# Patient Record
Sex: FEMALE | Race: WHITE | NOT HISPANIC OR LATINO | URBAN - METROPOLITAN AREA
[De-identification: names, ages, dates, MRNs, and addresses within clinical notes are randomized per-mention and may not be internally consistent; named-entity substitution may affect disease eponyms.]

---

## 2023-03-04 PROBLEM — Z17.0 MALIGNANT NEOPLASM OF UPPER-OUTER QUADRANT OF BREAST IN FEMALE, ESTROGEN RECEPTOR POSITIVE: Status: ACTIVE | Noted: 2023-03-04

## 2023-03-04 PROBLEM — Z80.41 FAMILY HISTORY OF MALIGNANT NEOPLASM OF OVARY: Status: ACTIVE | Noted: 2023-03-04

## 2023-03-04 PROBLEM — C50.419 MALIGNANT NEOPLASM OF UPPER-OUTER QUADRANT OF BREAST IN FEMALE, ESTROGEN RECEPTOR POSITIVE: Status: ACTIVE | Noted: 2023-03-04

## 2023-03-04 PROBLEM — N64.4 MASTODYNIA: Status: ACTIVE | Noted: 2023-03-04

## 2023-03-04 PROBLEM — Z80.3 FAMILY HISTORY OF MALIGNANT NEOPLASM OF BREAST: Status: ACTIVE | Noted: 2023-03-04

## 2023-04-07 DIAGNOSIS — Z17.0 MALIGNANT NEOPLASM OF UPPER-OUTER QUADRANT OF RIGHT BREAST IN FEMALE, ESTROGEN RECEPTOR POSITIVE: Primary | ICD-10-CM

## 2023-04-07 DIAGNOSIS — C50.411 MALIGNANT NEOPLASM OF UPPER-OUTER QUADRANT OF RIGHT BREAST IN FEMALE, ESTROGEN RECEPTOR POSITIVE: Primary | ICD-10-CM

## 2023-04-29 DIAGNOSIS — C50.411 MALIGNANT NEOPLASM OF UPPER-OUTER QUADRANT OF RIGHT BREAST IN FEMALE, ESTROGEN RECEPTOR POSITIVE: Primary | ICD-10-CM

## 2023-04-29 DIAGNOSIS — Z17.0 MALIGNANT NEOPLASM OF UPPER-OUTER QUADRANT OF RIGHT BREAST IN FEMALE, ESTROGEN RECEPTOR POSITIVE: Primary | ICD-10-CM

## 2023-04-29 NOTE — PROGRESS NOTES
[facility]  Shemar Coleman MD, FACS  Ev Cornejo NP-C      Chief Complaint:   Josey Gill is a 48 y.o. female presenting today for her 6 month follow up due to her breast cancer diagnosis.  She is due for her Mammogram and CXR.  She reports no interval changes on her self-breast examination.     History of Present Illness:   Mrs. Gill was diagnosed with right breast cancer in May 2016 at the age of 41. She elected lumpectomy that showed a 3 cm Grade II IDC with negative marigns.  2 of 4 SLNs were positive but there was no ARIELA. She completed adjuvant chemotherapy. She also completed adjuvant radiation with Dr. Graciela Whittington and has been on Tamoxifen since May 2017.   ER/NC Positive and HER 2 normal by FISH.  [employer] Comprehensive Panel Negative (2016). MD::: Pita Webb MD; Dean Paris MD; Ronni Driver MD    Past Medical History:   Diagnosis Date    Family history of malignant neoplasm of breast 3/4/2023    Family history of malignant neoplasm of ovary 3/4/2023    Malignant neoplasm of upper-outer quadrant of breast in female, estrogen receptor positive 3/4/2023    Mastodynia 3/4/2023      Past Surgical History:   Procedure Laterality Date    LUMPECTOMY, BREAST Right     with SLN biopsy    PORTACATH PLACEMENT          Current Outpatient Medications:     albuterol (PROVENTIL/VENTOLIN HFA) 90 mcg/actuation inhaler, Inhale into the lungs., Disp: , Rfl:     CALCIUM ORAL, Take by mouth., Disp: , Rfl:     celecoxib (CELEBREX) 200 MG capsule, Take 200 mg by mouth., Disp: , Rfl:     dulaglutide (TRULICITY) 0.75 mg/0.5 mL pen injector, Inject 0.8 mg into the skin., Disp: , Rfl:     ergocalciferol, vitamin D2, (VITAMIN D ORAL), Take by mouth., Disp: , Rfl:     fluticasone propionate (FLONASE) 50 mcg/actuation nasal spray, by Each Nostril route., Disp: , Rfl:     ipratropium (ATROVENT) 42 mcg (0.06 %) nasal spray, 2 sprays by Nasal route., Disp: , Rfl:     levothyroxine  (SYNTHROID) 25 MCG tablet, Take 25 mcg by mouth., Disp: , Rfl:     MOUNJARO 12.5 mg/0.5 mL PnIj, Inject into the skin., Disp: , Rfl:     ondansetron (ZOFRAN) 8 MG tablet, Take 1 tablet by mouth every 8 (eight) hours as needed., Disp: , Rfl:     pantoprazole (PROTONIX) 40 MG tablet, Take 1 tablet by mouth every morning., Disp: , Rfl:     propranoloL (INDERAL LA) 120 MG 24 hr capsule, Take 120 mg by mouth., Disp: , Rfl:     rosuvastatin (CRESTOR) 10 MG tablet, Take 10 mg by mouth every evening., Disp: , Rfl:     tamoxifen (NOLVADEX) 20 MG Tab, Take 20 mg by mouth., Disp: , Rfl:     vitamin E acetate (VITAMIN E ORAL), Take by mouth., Disp: , Rfl:    Review of patient's allergies indicates:   Allergen Reactions    Morphine Hives     Rash to face      Social History     Tobacco Use    Smoking status: Every Day     Types: Cigarettes    Smokeless tobacco: Never   Substance Use Topics    Alcohol use: Not on file      Family History   Problem Relation Age of Onset    Breast cancer Maternal Grandmother 75    Breast cancer Maternal Aunt 50    Breast cancer Maternal Aunt     Breast cancer Maternal Aunt 43    Ovarian cancer Maternal Cousin         Review of Systems   Integumentary:  Negative for color change, rash, mole/lesion, breast mass, breast discharge and breast tenderness.   Breast: Negative for mass and tenderness     Physical Exam   Constitutional: She is cooperative.   HENT:   Head: Normocephalic.   Pulmonary/Chest: She exhibits no mass. Right breast exhibits no inverted nipple, no mass, no nipple discharge, no skin change and no tenderness. Left breast exhibits no inverted nipple, no mass, no nipple discharge, no skin change and no tenderness.   Abdominal: Normal appearance.   Musculoskeletal: Lymphadenopathy:      Upper Body:      Right upper body: No supraclavicular or axillary adenopathy.      Left upper body: No supraclavicular or axillary adenopathy.     Neurological: She is alert.   Skin: No rash noted.         MAMMOGRAM REPORT: She has some diffuse fibronodular tissue bilaterally; there are no spiculated lesions, distortions or suspicious calcifications noted; NEM    CXR REPORT: Her lungs are well aerated and without worrisome masses; no pleural effusion noted; NEM      NOTE:::We viewed her films together at today's visit.  We discussed the multiple views obtained and the important findings.  Even benign changes were mentioned and her questions were answered.  She knows that she may receive a formal letter or report from the Radiologist.  She is to contact us if she has questions.      ASSESSMENT and PLAN of CARE    1. Malignant neoplasm of upper-outer quadrant of right breast in female, estrogen receptor positive  Assessment & Plan:   Patient is doing well and is being followed according to NCCN Guidelines. She understands that her imaging and exams have remained stable and is comfortable being followed in a conservative fashion. She understands the importance of monthly self-breast examination and knows to report any and all changes as they occur.    She should continue her Tamoxifen as directed.  We discussed some of the side effects and methods to combat these.  Should she have any issues, she can contact us as needed.    Labs obtained today: CBC, Chem 12, CEA, LDH, CA27/29 - after resulted, these will be compared to her previous values and all abnormal values will be phoned to her for discussion.      Orders:  -     Lactate Dehydrogenase  -     Comprehensive Metabolic Panel  -     CEA  -     CBC Auto Differential  -     Cancer Antigen 27-29    2. Mastodynia  Assessment & Plan:  We discussed our fibrocystic mastopathy protocol in detail. She knows that if she follows this protocol - that her symptoms should improve.  We discussed how breast pain is usually not associated with breast cancer, however, pain can be the presenting symptom with some cancers (but this could be coincidental). Still, if her pain does not  improve in 8-12 weeks she should call us back for additional recommendations.        3. Family history of malignant neoplasm of breast  Assessment & Plan:  We discussed her family history and how it could impact her own future risks.  We discussed family vs. genetic history and the importance and implications of each.  Genetic Counseling/Testing was offered, and all questions answered to her satisfaction.  She knows that as additional family members are diagnosed - she will need to let us know as this may change follow up and imaging recommendations.        Medical Decision Making:  It is my impression that the patient suffers from all the listed conditions.  Each of these conditions did affect my Plan of Care and all medical decisions that were rendered.  The medical decision making was of high difficulty based on her comorbidities and her previous diagnosis of breast cancer, which can pose a direct threat to her life.  This patient is currently being treated with ongoing Hormonal Agents that pose inherent risks. Laboratory evaluations are currently pending but will be reviewed in the next 24 hours. Any further recommendations will be communicated to the patient by me.  I have reviewed and verified her allergies, list of medications, medical and surgical histories, social history, and a pertinent review of symptoms.      Follow up:  6 months and prn    For:  PE and US with me    Orders Placed This Encounter   Procedures    Lactate Dehydrogenase     Please fax a copy of all labs on this date to Dr. Dean Paris.  Thanks.    Comprehensive Metabolic Panel     Please fax a copy of all labs on this date to Dr. Dean Paris.  Thanks.    CEA     Please fax a copy of all labs on this date to Dr. Dean Paris.  Thanks.    CBC Auto Differential     Please fax a copy of all labs on this date to Dr. Dean Paris.  Thanks.    Cancer Antigen 27-29

## 2023-04-29 NOTE — ASSESSMENT & PLAN NOTE
Patient is doing well and is being followed according to NCCN Guidelines. She understands that her imaging and exams have remained stable and is comfortable being followed in a conservative fashion. She understands the importance of monthly self-breast examination and knows to report any and all changes as they occur.    She should continue her Tamoxifen as directed.  We discussed some of the side effects and methods to combat these.  Should she have any issues, she can contact us as needed.    Labs obtained today: CBC, Chem 12, CEA, LDH, CA27/29 - after resulted, these will be compared to her previous values and all abnormal values will be phoned to her for discussion.

## 2023-05-10 ENCOUNTER — OFFICE VISIT (OUTPATIENT)
Dept: SURGERY | Facility: CLINIC | Age: 48
End: 2023-05-10
Payer: COMMERCIAL

## 2023-05-10 DIAGNOSIS — Z17.0 MALIGNANT NEOPLASM OF UPPER-OUTER QUADRANT OF RIGHT BREAST IN FEMALE, ESTROGEN RECEPTOR POSITIVE: ICD-10-CM

## 2023-05-10 DIAGNOSIS — C50.411 MALIGNANT NEOPLASM OF UPPER-OUTER QUADRANT OF RIGHT BREAST IN FEMALE, ESTROGEN RECEPTOR POSITIVE: ICD-10-CM

## 2023-05-10 DIAGNOSIS — Z80.3 FAMILY HISTORY OF MALIGNANT NEOPLASM OF BREAST: ICD-10-CM

## 2023-05-10 DIAGNOSIS — N64.4 MASTODYNIA: ICD-10-CM

## 2023-05-10 LAB
ALBUMIN SERPL BCP-MCNC: 4 G/DL (ref 3.5–5.2)
ALP SERPL-CCNC: 40 U/L (ref 55–135)
ALT SERPL W/O P-5'-P-CCNC: 7 U/L (ref 10–44)
ANION GAP SERPL CALC-SCNC: 7 MMOL/L (ref 8–16)
AST SERPL-CCNC: 17 U/L (ref 10–40)
BASOPHILS # BLD AUTO: 0.05 K/UL (ref 0–0.2)
BASOPHILS NFR BLD: 1 % (ref 0–1.9)
BILIRUB SERPL-MCNC: 0.4 MG/DL (ref 0.1–1)
BUN SERPL-MCNC: 11 MG/DL (ref 6–20)
CALCIUM SERPL-MCNC: 9.7 MG/DL (ref 8.7–10.5)
CEA SERPL-MCNC: <1.7 NG/ML (ref 0–5)
CHLORIDE SERPL-SCNC: 108 MMOL/L (ref 95–110)
CO2 SERPL-SCNC: 26 MMOL/L (ref 23–29)
CREAT SERPL-MCNC: 0.7 MG/DL (ref 0.5–1.4)
DIFFERENTIAL METHOD: NORMAL
EOSINOPHIL # BLD AUTO: 0.1 K/UL (ref 0–0.5)
EOSINOPHIL NFR BLD: 2.7 % (ref 0–8)
ERYTHROCYTE [DISTWIDTH] IN BLOOD BY AUTOMATED COUNT: 12.1 % (ref 11.5–14.5)
EST. GFR  (NO RACE VARIABLE): >60 ML/MIN/1.73 M^2
GLUCOSE SERPL-MCNC: 70 MG/DL (ref 70–110)
HCT VFR BLD AUTO: 38.5 % (ref 37–48.5)
HGB BLD-MCNC: 12.8 G/DL (ref 12–16)
IMM GRANULOCYTES # BLD AUTO: 0.01 K/UL (ref 0–0.04)
IMM GRANULOCYTES NFR BLD AUTO: 0.2 % (ref 0–0.5)
LDH SERPL L TO P-CCNC: 127 U/L (ref 110–260)
LYMPHOCYTES # BLD AUTO: 1.9 K/UL (ref 1–4.8)
LYMPHOCYTES NFR BLD: 35.8 % (ref 18–48)
MCH RBC QN AUTO: 30.5 PG (ref 27–31)
MCHC RBC AUTO-ENTMCNC: 33.2 G/DL (ref 32–36)
MCV RBC AUTO: 92 FL (ref 82–98)
MONOCYTES # BLD AUTO: 0.3 K/UL (ref 0.3–1)
MONOCYTES NFR BLD: 6.1 % (ref 4–15)
NEUTROPHILS # BLD AUTO: 2.8 K/UL (ref 1.8–7.7)
NEUTROPHILS NFR BLD: 54.2 % (ref 38–73)
NRBC BLD-RTO: 0 /100 WBC
PLATELET # BLD AUTO: 175 K/UL (ref 150–450)
PMV BLD AUTO: 12.1 FL (ref 9.2–12.9)
POTASSIUM SERPL-SCNC: 4.2 MMOL/L (ref 3.5–5.1)
PROT SERPL-MCNC: 6.2 G/DL (ref 6–8.4)
RBC # BLD AUTO: 4.19 M/UL (ref 4–5.4)
SODIUM SERPL-SCNC: 141 MMOL/L (ref 136–145)
WBC # BLD AUTO: 5.22 K/UL (ref 3.9–12.7)

## 2023-05-10 PROCEDURE — 80053 COMPREHEN METABOLIC PANEL: CPT | Performed by: SPECIALIST

## 2023-05-10 PROCEDURE — 1159F PR MEDICATION LIST DOCUMENTED IN MEDICAL RECORD: ICD-10-PCS | Mod: CPTII,S$GLB,, | Performed by: SPECIALIST

## 2023-05-10 PROCEDURE — 1160F RVW MEDS BY RX/DR IN RCRD: CPT | Mod: CPTII,S$GLB,, | Performed by: SPECIALIST

## 2023-05-10 PROCEDURE — 86300 IMMUNOASSAY TUMOR CA 15-3: CPT | Performed by: SPECIALIST

## 2023-05-10 PROCEDURE — 1159F MED LIST DOCD IN RCRD: CPT | Mod: CPTII,S$GLB,, | Performed by: SPECIALIST

## 2023-05-10 PROCEDURE — 1160F PR REVIEW ALL MEDS BY PRESCRIBER/CLIN PHARMACIST DOCUMENTED: ICD-10-PCS | Mod: CPTII,S$GLB,, | Performed by: SPECIALIST

## 2023-05-10 PROCEDURE — 83615 LACTATE (LD) (LDH) ENZYME: CPT | Performed by: SPECIALIST

## 2023-05-10 PROCEDURE — 99214 PR OFFICE/OUTPT VISIT, EST, LEVL IV, 30-39 MIN: ICD-10-PCS | Mod: S$GLB,,, | Performed by: SPECIALIST

## 2023-05-10 PROCEDURE — 85025 COMPLETE CBC W/AUTO DIFF WBC: CPT | Performed by: SPECIALIST

## 2023-05-10 PROCEDURE — 82378 CARCINOEMBRYONIC ANTIGEN: CPT | Performed by: SPECIALIST

## 2023-05-10 PROCEDURE — 99214 OFFICE O/P EST MOD 30 MIN: CPT | Mod: S$GLB,,, | Performed by: SPECIALIST

## 2023-05-10 RX ORDER — FLUTICASONE PROPIONATE 50 MCG
SPRAY, SUSPENSION (ML) NASAL
COMMUNITY
Start: 2023-05-08

## 2023-05-10 RX ORDER — IPRATROPIUM BROMIDE 42 UG/1
2 SPRAY, METERED NASAL
COMMUNITY
Start: 2023-04-12 | End: 2024-04-11

## 2023-05-10 RX ORDER — DULAGLUTIDE 0.75 MG/.5ML
0.8 INJECTION, SOLUTION SUBCUTANEOUS
COMMUNITY
Start: 2023-04-03 | End: 2023-09-30

## 2023-05-10 RX ORDER — LEVOTHYROXINE SODIUM 25 UG/1
25 TABLET ORAL
COMMUNITY
Start: 2023-02-23

## 2023-05-10 RX ORDER — TAMOXIFEN CITRATE 20 MG/1
20 TABLET ORAL
COMMUNITY
Start: 2023-04-13

## 2023-05-10 RX ORDER — ALBUTEROL SULFATE 90 UG/1
AEROSOL, METERED RESPIRATORY (INHALATION)
COMMUNITY
Start: 2023-04-14

## 2023-05-10 RX ORDER — PROPRANOLOL HYDROCHLORIDE 120 MG/1
120 CAPSULE, EXTENDED RELEASE ORAL
COMMUNITY
Start: 2023-04-08

## 2023-05-10 RX ORDER — PANTOPRAZOLE SODIUM 40 MG/1
1 TABLET, DELAYED RELEASE ORAL EVERY MORNING
COMMUNITY
Start: 2023-04-04 | End: 2024-04-03

## 2023-05-10 RX ORDER — TIRZEPATIDE 12.5 MG/.5ML
INJECTION, SOLUTION SUBCUTANEOUS
COMMUNITY
Start: 2023-04-02

## 2023-05-10 RX ORDER — ROSUVASTATIN CALCIUM 10 MG/1
10 TABLET, COATED ORAL NIGHTLY
COMMUNITY
Start: 2023-04-28

## 2023-05-10 RX ORDER — ONDANSETRON HYDROCHLORIDE 8 MG/1
1 TABLET, FILM COATED ORAL EVERY 8 HOURS PRN
COMMUNITY
Start: 2022-06-02

## 2023-05-10 RX ORDER — CELECOXIB 200 MG/1
200 CAPSULE ORAL
COMMUNITY
Start: 2023-04-28

## 2023-05-15 LAB — CANCER AG27-29 SERPL-ACNC: <12 U/ML

## 2024-02-03 NOTE — ASSESSMENT & PLAN NOTE
She is doing well and will continue to be followed according to NCCN Guidelines. She understands that her imaging and exams have remained stable and is comfortable being followed in a conservative fashion. She understands the importance of monthly self-breast examination and knows to report any and all changes as they occur.    She should continue her Tamoxifen as directed.  We discussed some of the side effects and methods to combat these.  Should she have any issues, she can contact us as needed.    Labs obtained today: CBC, Chem 12, CEA, LDH, CA 27.29 - after resulted, these will be compared to her previous values and all abnormal values will be phoned to her for discussion.

## 2024-02-03 NOTE — PROGRESS NOTES
Date of Service: 2/19/2024    Chief Complaint:   Josey Gill is a 49 y.o. female presenting today for her 6-month evaluation given her previous diagnosis of breast cancer. She is due for an ultrasound.  She reports no interval changes.     History of Present Illness:   Mrs. Gill was diagnosed with right breast cancer in May 2016 at the age of 41. She elected lumpectomy that showed a 3 cm Grade II IDC with negative marigns.  2 of 4 SLNs were positive but there was no ARIELA. She completed adjuvant chemotherapy. She also completed adjuvant radiation with Dr. Graciela Whittington and has been on Tamoxifen since May 2017.   ER/FL Positive and HER 2 normal by FISH.  [employer] Comprehensive Panel Negative (2016). MD::: Pita Webb MD; Dean Paris MD; Ronni Driver MD    Past Medical History:   Diagnosis Date    Family history of malignant neoplasm of breast 3/4/2023    Family history of malignant neoplasm of ovary 3/4/2023    Malignant neoplasm of upper-outer quadrant of breast in female, estrogen receptor positive 3/4/2023    Mastodynia 3/4/2023      Past Surgical History:   Procedure Laterality Date    LUMPECTOMY, BREAST Right     with SLN biopsy    PORTACATH PLACEMENT          Current Outpatient Medications:     albuterol (PROVENTIL/VENTOLIN HFA) 90 mcg/actuation inhaler, Inhale into the lungs., Disp: , Rfl:     CALCIUM ORAL, Take by mouth., Disp: , Rfl:     celecoxib (CELEBREX) 200 MG capsule, Take 200 mg by mouth., Disp: , Rfl:     ergocalciferol, vitamin D2, (VITAMIN D ORAL), Take by mouth., Disp: , Rfl:     fluticasone propionate (FLONASE) 50 mcg/actuation nasal spray, by Each Nostril route., Disp: , Rfl:     ipratropium (ATROVENT) 42 mcg (0.06 %) nasal spray, 2 sprays by Nasal route., Disp: , Rfl:     levothyroxine (SYNTHROID) 25 MCG tablet, Take 25 mcg by mouth., Disp: , Rfl:     MOUNJARO 12.5 mg/0.5 mL PnIj, Inject into the skin., Disp: , Rfl:     ondansetron (ZOFRAN) 8 MG tablet, Take 1 tablet by  mouth every 8 (eight) hours as needed., Disp: , Rfl:     pantoprazole (PROTONIX) 40 MG tablet, Take 1 tablet by mouth every morning., Disp: , Rfl:     propranoloL (INDERAL LA) 120 MG 24 hr capsule, Take 120 mg by mouth., Disp: , Rfl:     rosuvastatin (CRESTOR) 10 MG tablet, Take 10 mg by mouth every evening., Disp: , Rfl:     tamoxifen (NOLVADEX) 20 MG Tab, Take 20 mg by mouth., Disp: , Rfl:     vitamin E acetate (VITAMIN E ORAL), Take by mouth., Disp: , Rfl:    Review of patient's allergies indicates:   Allergen Reactions    Morphine Hives     Rash to face      Social History     Tobacco Use    Smoking status: Every Day     Types: Cigarettes    Smokeless tobacco: Never   Substance Use Topics    Alcohol use: Not on file      Family History   Problem Relation Age of Onset    Breast cancer Maternal Grandmother 75    Breast cancer Maternal Aunt 50    Breast cancer Maternal Aunt     Breast cancer Maternal Aunt 43    Ovarian cancer Maternal Cousin         Review of Systems   Integumentary:  Negative for color change, rash, mole/lesion, breast mass, breast discharge and breast tenderness.   Breast: Negative for mass and tenderness       Physical Exam   Constitutional: She is cooperative.   HENT:   Head: Normocephalic.   Pulmonary/Chest: She exhibits no mass. Right breast exhibits no inverted nipple, no mass, no nipple discharge, no skin change and no tenderness. Left breast exhibits no inverted nipple, no mass, no nipple discharge, no skin change and no tenderness.   Abdominal: Normal appearance.   Musculoskeletal: Lymphadenopathy:      Upper Body:      Right upper body: No supraclavicular or axillary adenopathy.      Left upper body: No supraclavicular or axillary adenopathy.     Neurological: She is alert.   Skin: No rash noted.        ULTRASOUND EVALUATION and REPORT    Bilateral real-time Ultrasound was performed by me.  All four quadrants of the breast, the subareolar and axillary basins were scanned.    She has some  heterogeneous dense fibroglandular tissue bilaterally.  We discussed  this dense tissue and its potential implications.    Right Breast: She has normal tissues in the right breast; there's no disruption of the tissue planes (except at the site of her scar(s)) and no abnormal shadowing; she has normal skin thickness with no subcutaneous nodules of skin thickening; NEM     Left Breast: She has normal tissues in the left breast; there's no disruption of the tissue planes and no abnormal shadowing; she has normal skin thickness with no subcutaneous nodules or skin thickening; NEM     Axillae: There are no abnormal lymph nodes seen bilaterally.     Impression: Some dense but normal tissue bilaterally with no solid/suspicious masses noted. No LAD in bilateral axillae.      BIRADS: Category 2 - Benign Finding.    Findings were discussed with patient in real time and a hand written report was given to her at the conclusion of the exam.        ASSESSMENT and PLAN OF CARE     1. Malignant neoplasm of upper-outer quadrant of right breast in female, estrogen receptor positive  Assessment & Plan:  She is doing well and will continue to be followed according to NCCN Guidelines. She understands that her imaging and exams have remained stable and is comfortable being followed in a conservative fashion. She understands the importance of monthly self-breast examination and knows to report any and all changes as they occur.    She should continue her Tamoxifen as directed.  We discussed some of the side effects and methods to combat these.  Should she have any issues, she can contact us as needed.    Labs obtained today: CBC, Chem 12, CEA, LDH, CA 27.29 - after resulted, these will be compared to her previous values and all abnormal values will be phoned to her for discussion.      Orders:  -     CEA  -     Lactate Dehydrogenase  -     CBC Auto Differential  -     Comprehensive Metabolic Panel  -     Cancer Antigen 27-29    2. Family  history of malignant neoplasm of breast  Assessment & Plan:  We discussed her family history and how it could impact her own future risks.  We discussed family vs. genetic history and the importance and implications of each.  Genetic Counseling/Testing was offered, and all questions answered to her satisfaction.  She knows that as additional family members are diagnosed - she will need to let us know as this may change follow up and imaging recommendations.    We had a discussion concerning Breast Cancer Risk Reduction and current NCCN Guidelines. She knows that her risk can be lowered slightly with a healthy lifestyle and minimal ETOH use. Being physically active will also help. She should reduce or stay away from OCPs and HRT as possible.         Medical Decision Making: HT - It is my impression that the patient suffers from all the listed conditions.  Each of these conditions did affect my Plan of Care and all medical decisions that were rendered.  The medical decision making was of high difficulty based on her comorbidities and her previous diagnosis of breast cancer, which can pose a direct threat to her life.  This patient is currently being treated with ongoing Hormonal Agents that pose inherent risks. Laboratory evaluations are currently pending but will be reviewed in the next 24 hours. Any further recommendations will be communicated to the patient by me.  I have reviewed and verified her allergies, list of medications, medical and surgical histories, social history, and a pertinent review of symptoms.      Follow up: 6 months and prn     For:  Physical Examination, MGM (D) at , CXR, and LABS

## 2024-02-19 ENCOUNTER — OFFICE VISIT (OUTPATIENT)
Dept: SURGERY | Facility: CLINIC | Age: 49
End: 2024-02-19
Payer: COMMERCIAL

## 2024-02-19 DIAGNOSIS — Z80.3 FAMILY HISTORY OF MALIGNANT NEOPLASM OF BREAST: ICD-10-CM

## 2024-02-19 DIAGNOSIS — C50.411 MALIGNANT NEOPLASM OF UPPER-OUTER QUADRANT OF RIGHT BREAST IN FEMALE, ESTROGEN RECEPTOR POSITIVE: Primary | ICD-10-CM

## 2024-02-19 DIAGNOSIS — Z17.0 MALIGNANT NEOPLASM OF UPPER-OUTER QUADRANT OF RIGHT BREAST IN FEMALE, ESTROGEN RECEPTOR POSITIVE: Primary | ICD-10-CM

## 2024-02-19 LAB
ALBUMIN SERPL BCP-MCNC: 4.1 G/DL (ref 3.5–5.2)
ALP SERPL-CCNC: 45 U/L (ref 55–135)
ALT SERPL W/O P-5'-P-CCNC: 11 U/L (ref 10–44)
ANION GAP SERPL CALC-SCNC: 7 MMOL/L (ref 8–16)
AST SERPL-CCNC: 23 U/L (ref 10–40)
BASOPHILS # BLD AUTO: 0.05 K/UL (ref 0–0.2)
BASOPHILS NFR BLD: 0.8 % (ref 0–1.9)
BILIRUB SERPL-MCNC: 0.6 MG/DL (ref 0.1–1)
BUN SERPL-MCNC: 12 MG/DL (ref 6–20)
CALCIUM SERPL-MCNC: 9.4 MG/DL (ref 8.7–10.5)
CEA SERPL-MCNC: 1.7 NG/ML (ref 0–5)
CHLORIDE SERPL-SCNC: 107 MMOL/L (ref 95–110)
CO2 SERPL-SCNC: 27 MMOL/L (ref 23–29)
CREAT SERPL-MCNC: 0.6 MG/DL (ref 0.5–1.4)
DIFFERENTIAL METHOD BLD: NORMAL
EOSINOPHIL # BLD AUTO: 0.1 K/UL (ref 0–0.5)
EOSINOPHIL NFR BLD: 2.2 % (ref 0–8)
ERYTHROCYTE [DISTWIDTH] IN BLOOD BY AUTOMATED COUNT: 11.8 % (ref 11.5–14.5)
EST. GFR  (NO RACE VARIABLE): >60 ML/MIN/1.73 M^2
GLUCOSE SERPL-MCNC: 69 MG/DL (ref 70–110)
HCT VFR BLD AUTO: 40.4 % (ref 37–48.5)
HGB BLD-MCNC: 13.2 G/DL (ref 12–16)
IMM GRANULOCYTES # BLD AUTO: 0.01 K/UL (ref 0–0.04)
IMM GRANULOCYTES NFR BLD AUTO: 0.2 % (ref 0–0.5)
LDH SERPL L TO P-CCNC: 127 U/L (ref 110–260)
LYMPHOCYTES # BLD AUTO: 2.3 K/UL (ref 1–4.8)
LYMPHOCYTES NFR BLD: 37.9 % (ref 18–48)
MCH RBC QN AUTO: 30.7 PG (ref 27–31)
MCHC RBC AUTO-ENTMCNC: 32.7 G/DL (ref 32–36)
MCV RBC AUTO: 94 FL (ref 82–98)
MONOCYTES # BLD AUTO: 0.4 K/UL (ref 0.3–1)
MONOCYTES NFR BLD: 6.7 % (ref 4–15)
NEUTROPHILS # BLD AUTO: 3.1 K/UL (ref 1.8–7.7)
NEUTROPHILS NFR BLD: 52.2 % (ref 38–73)
NRBC BLD-RTO: 0 /100 WBC
PLATELET # BLD AUTO: 171 K/UL (ref 150–450)
PMV BLD AUTO: 11.9 FL (ref 9.2–12.9)
POTASSIUM SERPL-SCNC: 3.5 MMOL/L (ref 3.5–5.1)
PROT SERPL-MCNC: 6.5 G/DL (ref 6–8.4)
RBC # BLD AUTO: 4.3 M/UL (ref 4–5.4)
SODIUM SERPL-SCNC: 141 MMOL/L (ref 136–145)
WBC # BLD AUTO: 5.97 K/UL (ref 3.9–12.7)

## 2024-02-19 PROCEDURE — 86300 IMMUNOASSAY TUMOR CA 15-3: CPT | Performed by: SPECIALIST

## 2024-02-19 PROCEDURE — 99999 PR PBB SHADOW E&M-EST. PATIENT-LVL III: CPT | Mod: PBBFAC,,, | Performed by: SPECIALIST

## 2024-02-19 PROCEDURE — 1159F MED LIST DOCD IN RCRD: CPT | Mod: CPTII,S$GLB,, | Performed by: SPECIALIST

## 2024-02-19 PROCEDURE — 1160F RVW MEDS BY RX/DR IN RCRD: CPT | Mod: CPTII,S$GLB,, | Performed by: SPECIALIST

## 2024-02-19 PROCEDURE — 85025 COMPLETE CBC W/AUTO DIFF WBC: CPT | Performed by: SPECIALIST

## 2024-02-19 PROCEDURE — 82378 CARCINOEMBRYONIC ANTIGEN: CPT | Performed by: SPECIALIST

## 2024-02-19 PROCEDURE — 83615 LACTATE (LD) (LDH) ENZYME: CPT | Performed by: SPECIALIST

## 2024-02-19 PROCEDURE — 99214 OFFICE O/P EST MOD 30 MIN: CPT | Mod: S$GLB,,, | Performed by: SPECIALIST

## 2024-02-19 PROCEDURE — 80053 COMPREHEN METABOLIC PANEL: CPT | Performed by: SPECIALIST

## 2024-02-21 LAB — CANCER AG27-29 SERPL-ACNC: 13.9 U/ML

## 2024-08-21 DIAGNOSIS — C50.411 MALIGNANT NEOPLASM OF UPPER-OUTER QUADRANT OF RIGHT BREAST IN FEMALE, ESTROGEN RECEPTOR POSITIVE: Primary | ICD-10-CM

## 2024-08-21 DIAGNOSIS — Z17.0 MALIGNANT NEOPLASM OF UPPER-OUTER QUADRANT OF RIGHT BREAST IN FEMALE, ESTROGEN RECEPTOR POSITIVE: Primary | ICD-10-CM

## 2024-09-10 ENCOUNTER — TELEPHONE (OUTPATIENT)
Dept: SURGERY | Facility: CLINIC | Age: 49
End: 2024-09-10
Payer: COMMERCIAL

## 2024-09-25 ENCOUNTER — TELEPHONE (OUTPATIENT)
Dept: SURGERY | Facility: CLINIC | Age: 49
End: 2024-09-25
Payer: COMMERCIAL

## 2024-09-26 ENCOUNTER — TELEPHONE (OUTPATIENT)
Dept: SURGERY | Facility: CLINIC | Age: 49
End: 2024-09-26
Payer: COMMERCIAL
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
## 2024-09-27 ENCOUNTER — TELEPHONE (OUTPATIENT)
Dept: SURGERY | Facility: CLINIC | Age: 49
End: 2024-09-27
Payer: COMMERCIAL

## 2024-10-04 NOTE — PROGRESS NOTES
Date of Service: 10/16/2024    Chief Complaint:   Josey Gill is a 49 y.o. female presenting today for her 6 month follow up due to her breast cancer diagnosis.  She is due for her Mammogram and CXR.  She reports no interval changes on her self-breast examination.     History of Present Illness:   Mrs. Gill was diagnosed with right breast cancer in May 2016 at the age of 41. She elected lumpectomy that showed a 3 cm Grade II IDC with negative marigns.  2 of 4 SLNs were positive but there was no ARIELA. She completed adjuvant chemotherapy. She also completed adjuvant radiation with Dr. Graciela Whittington and has been on Tamoxifen since May 2017.   ER/MS Positive and HER 2 normal by FISH.  [employer] Comprehensive Panel Negative (2016). MD::: Pita Webb MD; Dean Paris MD; Ronni Driver MD    Past Medical History:   Diagnosis Date    Family history of malignant neoplasm of breast 3/4/2023    Family history of malignant neoplasm of ovary 3/4/2023    Malignant neoplasm of upper-outer quadrant of breast in female, estrogen receptor positive 3/4/2023    Mastodynia 3/4/2023      Past Surgical History:   Procedure Laterality Date    LUMPECTOMY, BREAST Right     with SLN biopsy    PORTACATH PLACEMENT          Current Outpatient Medications:     albuterol (PROVENTIL/VENTOLIN HFA) 90 mcg/actuation inhaler, Inhale into the lungs., Disp: , Rfl:     CALCIUM ORAL, Take by mouth., Disp: , Rfl:     celecoxib (CELEBREX) 200 MG capsule, Take 200 mg by mouth., Disp: , Rfl:     ergocalciferol, vitamin D2, (VITAMIN D ORAL), Take by mouth., Disp: , Rfl:     fluticasone propionate (FLONASE) 50 mcg/actuation nasal spray, by Each Nostril route., Disp: , Rfl:     levothyroxine (SYNTHROID) 25 MCG tablet, Take 25 mcg by mouth., Disp: , Rfl:     MOUNJARO 12.5 mg/0.5 mL PnIj, Inject into the skin., Disp: , Rfl:     ondansetron (ZOFRAN) 8 MG tablet, Take 1 tablet by mouth every 8 (eight) hours as needed., Disp: , Rfl:     pantoprazole  (PROTONIX) 40 MG tablet, Take 1 tablet by mouth every morning., Disp: , Rfl:     propranoloL (INDERAL LA) 120 MG 24 hr capsule, Take 120 mg by mouth., Disp: , Rfl:     rosuvastatin (CRESTOR) 10 MG tablet, Take 10 mg by mouth every evening., Disp: , Rfl:     tamoxifen (NOLVADEX) 20 MG Tab, Take 20 mg by mouth., Disp: , Rfl:     vitamin E acetate (VITAMIN E ORAL), Take by mouth., Disp: , Rfl:    Review of patient's allergies indicates:   Allergen Reactions    Morphine Hives     Rash to face      Social History     Tobacco Use    Smoking status: Every Day     Types: Cigarettes    Smokeless tobacco: Never   Substance Use Topics    Alcohol use: Not on file      Family History   Problem Relation Name Age of Onset    Breast cancer Maternal Grandmother  75    Breast cancer Maternal Aunt  50    Breast cancer Maternal Aunt      Breast cancer Maternal Aunt  43    Ovarian cancer Maternal Cousin          Review of Systems   Integumentary:  Negative for color change, rash, mole/lesion, breast mass, breast discharge and breast tenderness.   Breast: Negative for mass and tenderness       Physical Exam   Constitutional: She is cooperative.   HENT:   Head: Normocephalic.   Pulmonary/Chest: She exhibits no mass. Right breast exhibits no inverted nipple, no mass, no nipple discharge, no skin change and no tenderness. Left breast exhibits no inverted nipple, no mass, no nipple discharge, no skin change and no tenderness.   Abdominal: Normal appearance.   Musculoskeletal: Lymphadenopathy:      Upper Body:      Right upper body: No supraclavicular or axillary adenopathy.      Left upper body: No supraclavicular or axillary adenopathy.     Neurological: She is alert.   Skin: No rash noted.        MAMMOGRAM REPORT: She has some diffuse fibronodular tissue; there are no spiculated lesions, distortions or suspicious calcifications noted; NEM    ULTRASOUND REPORT: She has normal tissues in her area of pain; NEM     CXR REPORT: Her lungs are  well aerated and without worrisome masses; no pleural effusion noted; NEM       ASSESSMENT and PLAN OF CARE     1. Family history of malignant neoplasm of breast  Assessment & Plan:  We discussed her family history and how it could impact her own future risks.  We discussed family vs. genetic history and the importance and implications of each.  Genetic Counseling/Testing was offered, and all questions answered to her satisfaction.  She knows that as additional family members are diagnosed - she will need to let us know as this may change follow up and imaging recommendations.    We had a discussion concerning Breast Cancer Risk Reduction and current NCCN Guidelines. She knows that her risk can be lowered slightly with a healthy lifestyle and minimal ETOH use. Being physically active will also help. She should reduce or stay away from OCPs and HRT as possible.         Medical Decision Making: HT - It is my impression that the patient suffers from all the listed conditions.  Each of these conditions did affect my Plan of Care and all medical decisions that were rendered.  The medical decision making was of high difficulty based on her comorbidities and her previous diagnosis of breast cancer, which can pose a direct threat to her life.  This patient is currently being treated with ongoing Hormonal Agents that pose inherent risks. Laboratory evaluations are currently pending but will be reviewed in the next 24 hours. Any further recommendations will be communicated to the patient by me.  I have reviewed and verified her allergies, list of medications, medical and surgical histories, social history, and a pertinent review of symptoms.     Follow up:  1 year and prn    For:  Physical Examination, MGM (D) at , CXR, and LABS

## 2024-10-16 ENCOUNTER — LAB VISIT (OUTPATIENT)
Dept: LAB | Facility: HOSPITAL | Age: 49
End: 2024-10-16
Attending: SPECIALIST
Payer: COMMERCIAL

## 2024-10-16 ENCOUNTER — OFFICE VISIT (OUTPATIENT)
Dept: SURGERY | Facility: CLINIC | Age: 49
End: 2024-10-16
Payer: COMMERCIAL

## 2024-10-16 DIAGNOSIS — Z80.3 FAMILY HISTORY OF MALIGNANT NEOPLASM OF BREAST: Primary | ICD-10-CM

## 2024-10-16 DIAGNOSIS — Z80.3 FAMILY HISTORY OF MALIGNANT NEOPLASM OF BREAST: ICD-10-CM

## 2024-10-16 LAB
ALBUMIN SERPL BCP-MCNC: 4 G/DL (ref 3.5–5.2)
ALP SERPL-CCNC: 44 U/L (ref 55–135)
ALT SERPL W/O P-5'-P-CCNC: 7 U/L (ref 10–44)
ANION GAP SERPL CALC-SCNC: 8 MMOL/L (ref 8–16)
AST SERPL-CCNC: 17 U/L (ref 10–40)
BASOPHILS # BLD AUTO: 0.07 K/UL (ref 0–0.2)
BASOPHILS NFR BLD: 0.8 % (ref 0–1.9)
BILIRUB SERPL-MCNC: 0.4 MG/DL (ref 0.1–1)
BUN SERPL-MCNC: 8 MG/DL (ref 6–20)
CALCIUM SERPL-MCNC: 9.5 MG/DL (ref 8.7–10.5)
CEA SERPL-MCNC: 1.9 NG/ML (ref 0–5)
CHLORIDE SERPL-SCNC: 109 MMOL/L (ref 95–110)
CO2 SERPL-SCNC: 25 MMOL/L (ref 23–29)
CREAT SERPL-MCNC: 0.7 MG/DL (ref 0.5–1.4)
DIFFERENTIAL METHOD BLD: NORMAL
EOSINOPHIL # BLD AUTO: 0.2 K/UL (ref 0–0.5)
EOSINOPHIL NFR BLD: 1.8 % (ref 0–8)
ERYTHROCYTE [DISTWIDTH] IN BLOOD BY AUTOMATED COUNT: 12 % (ref 11.5–14.5)
EST. GFR  (NO RACE VARIABLE): >60 ML/MIN/1.73 M^2
GLUCOSE SERPL-MCNC: 66 MG/DL (ref 70–110)
HCT VFR BLD AUTO: 39 % (ref 37–48.5)
HGB BLD-MCNC: 12.7 G/DL (ref 12–16)
IMM GRANULOCYTES # BLD AUTO: 0.02 K/UL (ref 0–0.04)
IMM GRANULOCYTES NFR BLD AUTO: 0.2 % (ref 0–0.5)
LDH SERPL L TO P-CCNC: 128 U/L (ref 110–260)
LYMPHOCYTES # BLD AUTO: 2.5 K/UL (ref 1–4.8)
LYMPHOCYTES NFR BLD: 28.3 % (ref 18–48)
MCH RBC QN AUTO: 30.9 PG (ref 27–31)
MCHC RBC AUTO-ENTMCNC: 32.6 G/DL (ref 32–36)
MCV RBC AUTO: 95 FL (ref 82–98)
MONOCYTES # BLD AUTO: 0.5 K/UL (ref 0.3–1)
MONOCYTES NFR BLD: 5.7 % (ref 4–15)
NEUTROPHILS # BLD AUTO: 5.5 K/UL (ref 1.8–7.7)
NEUTROPHILS NFR BLD: 63.2 % (ref 38–73)
NRBC BLD-RTO: 0 /100 WBC
PLATELET # BLD AUTO: 164 K/UL (ref 150–450)
PMV BLD AUTO: 12.2 FL (ref 9.2–12.9)
POTASSIUM SERPL-SCNC: 3.7 MMOL/L (ref 3.5–5.1)
PROT SERPL-MCNC: 6.4 G/DL (ref 6–8.4)
RBC # BLD AUTO: 4.11 M/UL (ref 4–5.4)
SODIUM SERPL-SCNC: 142 MMOL/L (ref 136–145)
WBC # BLD AUTO: 8.73 K/UL (ref 3.9–12.7)

## 2024-10-16 PROCEDURE — 99999 PR PBB SHADOW E&M-EST. PATIENT-LVL III: CPT | Mod: PBBFAC,,, | Performed by: SPECIALIST

## 2024-10-16 PROCEDURE — 82378 CARCINOEMBRYONIC ANTIGEN: CPT | Performed by: SPECIALIST

## 2024-10-16 PROCEDURE — 80053 COMPREHEN METABOLIC PANEL: CPT | Performed by: SPECIALIST

## 2024-10-16 PROCEDURE — 36415 COLL VENOUS BLD VENIPUNCTURE: CPT | Mod: PN | Performed by: SPECIALIST

## 2024-10-16 PROCEDURE — 83615 LACTATE (LD) (LDH) ENZYME: CPT | Performed by: SPECIALIST

## 2024-10-16 PROCEDURE — 1160F RVW MEDS BY RX/DR IN RCRD: CPT | Mod: CPTII,S$GLB,, | Performed by: SPECIALIST

## 2024-10-16 PROCEDURE — 85025 COMPLETE CBC W/AUTO DIFF WBC: CPT | Performed by: SPECIALIST

## 2024-10-16 PROCEDURE — 3044F HG A1C LEVEL LT 7.0%: CPT | Mod: CPTII,S$GLB,, | Performed by: SPECIALIST

## 2024-10-16 PROCEDURE — 86300 IMMUNOASSAY TUMOR CA 15-3: CPT | Performed by: SPECIALIST

## 2024-10-16 PROCEDURE — 99214 OFFICE O/P EST MOD 30 MIN: CPT | Mod: S$GLB,,, | Performed by: SPECIALIST

## 2024-10-16 PROCEDURE — 1159F MED LIST DOCD IN RCRD: CPT | Mod: CPTII,S$GLB,, | Performed by: SPECIALIST

## 2024-10-22 LAB — CANCER AG27-29 SERPL-ACNC: 17 U/ML
